# Patient Record
Sex: FEMALE | Race: WHITE | Employment: FULL TIME | URBAN - METROPOLITAN AREA
[De-identification: names, ages, dates, MRNs, and addresses within clinical notes are randomized per-mention and may not be internally consistent; named-entity substitution may affect disease eponyms.]

---

## 2023-10-08 ENCOUNTER — APPOINTMENT (OUTPATIENT)
Dept: GENERAL RADIOLOGY | Age: 28
End: 2023-10-08
Payer: COMMERCIAL

## 2023-10-08 ENCOUNTER — HOSPITAL ENCOUNTER (EMERGENCY)
Age: 28
Discharge: HOME | End: 2023-10-08
Payer: COMMERCIAL

## 2023-10-08 VITALS
HEART RATE: 83 BPM | RESPIRATION RATE: 16 BRPM | TEMPERATURE: 97.3 F | OXYGEN SATURATION: 98 % | SYSTOLIC BLOOD PRESSURE: 113 MMHG | BODY MASS INDEX: 29.82 KG/M2 | DIASTOLIC BLOOD PRESSURE: 78 MMHG | WEIGHT: 190 LBS | HEIGHT: 67 IN
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
## 2023-10-08 DIAGNOSIS — S29.019A THORACIC MYOFASCIAL STRAIN, INITIAL ENCOUNTER: ICD-10-CM

## 2023-10-08 DIAGNOSIS — S16.1XXA CERVICAL STRAIN, ACUTE, INITIAL ENCOUNTER: Primary | ICD-10-CM

## 2023-10-08 PROCEDURE — 72072 X-RAY EXAM THORAC SPINE 3VWS: CPT

## 2023-10-08 PROCEDURE — 99283 EMERGENCY DEPT VISIT LOW MDM: CPT

## 2023-10-08 PROCEDURE — 72050 X-RAY EXAM NECK SPINE 4/5VWS: CPT

## 2023-10-08 ASSESSMENT — PAIN DESCRIPTION - LOCATION: LOCATION: BACK;NECK

## 2023-10-08 ASSESSMENT — LIFESTYLE VARIABLES
HOW MANY STANDARD DRINKS CONTAINING ALCOHOL DO YOU HAVE ON A TYPICAL DAY: PATIENT DOES NOT DRINK
HOW OFTEN DO YOU HAVE A DRINK CONTAINING ALCOHOL: NEVER

## 2023-10-08 ASSESSMENT — PAIN - FUNCTIONAL ASSESSMENT: PAIN_FUNCTIONAL_ASSESSMENT: 0-10

## 2023-10-08 NOTE — ED NOTES
Radiology Procedure Waiver   Name: Blanca Rizzo  : 1995  MRN: [id number]    Date:  10/8/23    Time: 7:36 PM EDT    Benefits of immediately proceeding with Radiology exam(s) without pre-testing outweigh the risks or are not indicated as specified below and therefore the following is/are being waived:    [x] Pregnancy test   [x] Patients LMP on-time and regular.   [] Patient had Tubal Ligation or has other Contraception Device. [] Patient  is Menopausal or Premenarcheal.    [] Patient had Full or Partial Hysterectomy. [] Protocol for Iodine allergy    [] MRI Questionnaire     [] BUN/Creatinine   [] Patient age w/no hx of renal dysfunction. [] Patient on Dialysis. [] Recent Normal Labs.   Electronically signed by Niranjan Fernández PA-C on 10/8/23 at 7:36 PM EDT               Niranjan Fernández PA-C  10/08/23 1936
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
## 2024-10-05 ENCOUNTER — HOSPITAL ENCOUNTER (OUTPATIENT)
Age: 29
Setting detail: OBSERVATION
Discharge: HOME | End: 2024-10-06
Attending: OBSTETRICS & GYNECOLOGY | Admitting: OBSTETRICS & GYNECOLOGY
Payer: OTHER GOVERNMENT

## 2024-10-05 VITALS
DIASTOLIC BLOOD PRESSURE: 71 MMHG | HEART RATE: 98 BPM | WEIGHT: 212 LBS | SYSTOLIC BLOOD PRESSURE: 124 MMHG | TEMPERATURE: 97.9 F | BODY MASS INDEX: 33.27 KG/M2 | RESPIRATION RATE: 18 BRPM | HEIGHT: 67 IN

## 2024-10-05 PROBLEM — Z3A.20 20 WEEKS GESTATION OF PREGNANCY: Status: ACTIVE | Noted: 2024-10-05

## 2024-10-05 LAB
BACTERIA URNS QL MICRO: ABNORMAL
BILIRUB UR QL STRIP: NEGATIVE
CLARITY UR: CLEAR
COLOR UR: YELLOW
EPI CELLS #/AREA URNS HPF: ABNORMAL /HPF
GLUCOSE UR STRIP-MCNC: NEGATIVE MG/DL
HGB UR QL STRIP.AUTO: NEGATIVE
KETONES UR STRIP-MCNC: ABNORMAL MG/DL
LEUKOCYTE ESTERASE UR QL STRIP: NEGATIVE
NITRITE UR QL STRIP: NEGATIVE
PH UR STRIP: 6 [PH] (ref 5–9)
PROT UR STRIP-MCNC: NEGATIVE MG/DL
RBC #/AREA URNS HPF: ABNORMAL /HPF
SP GR UR STRIP: 1.02 (ref 1–1.03)
UROBILINOGEN UR STRIP-ACNC: 0.2 EU/DL (ref 0–1)
WBC #/AREA URNS HPF: ABNORMAL /HPF

## 2024-10-05 PROCEDURE — 87086 URINE CULTURE/COLONY COUNT: CPT

## 2024-10-05 PROCEDURE — 99221 1ST HOSP IP/OBS SF/LOW 40: CPT | Performed by: ADVANCED PRACTICE MIDWIFE

## 2024-10-05 PROCEDURE — G0378 HOSPITAL OBSERVATION PER HR: HCPCS

## 2024-10-05 PROCEDURE — 81001 URINALYSIS AUTO W/SCOPE: CPT

## 2024-10-06 PROCEDURE — G0378 HOSPITAL OBSERVATION PER HR: HCPCS

## 2024-10-06 NOTE — H&P
CHIEF COMPLAINT:  woke up from nap prior to going to work, felt sick lightheaded dizzy and abdominal cramping.  Feels some flutters, no lof or vb    HISTORY OF PRESENT ILLNESS:      The patient is a 29 y.o. female at 20w6d.  OB History          1    Para        Term                AB        Living             SAB        IAB        Ectopic        Molar        Multiple        Live Births                Patient presents with a chief complaint as above and is being admitted for observation    Estimated Due Date: Estimated Date of Delivery: 25    PRENATAL CARE:    Complicated by: low lying placenta, TERE, obesity, hypothyroid on meds.    PAST OB HISTORY  OB History          1    Para        Term                AB        Living             SAB        IAB        Ectopic        Molar        Multiple        Live Births                    Past Medical History:    History reviewed. No pertinent past medical history.  Past Surgical History:    History reviewed. No pertinent surgical history.  Allergies:  Patient has no known allergies.  Social History:    Social History     Socioeconomic History    Marital status: Single     Spouse name: Not on file    Number of children: Not on file    Years of education: Not on file    Highest education level: Not on file   Occupational History    Not on file   Tobacco Use    Smoking status: Never    Smokeless tobacco: Never   Vaping Use    Vaping status: Never Used   Substance and Sexual Activity    Alcohol use: Not Currently    Drug use: Not Currently    Sexual activity: Not on file   Other Topics Concern    Not on file   Social History Narrative    Not on file     Social Determinants of Health     Financial Resource Strain: Not on file   Food Insecurity: Not on file   Transportation Needs: Not on file   Physical Activity: Not on file   Stress: Not on file   Social Connections: Not on file   Intimate Partner Violence: Not on file   Housing Stability: Not

## 2024-10-06 NOTE — PROGRESS NOTES
Patient given verbal and written discharge instructions with standard labor precautions instructed to keep follow up appointment with physician. Patient verbalizes understanding states no questions or concerns. Patient ambulatory off unit with family member.

## 2024-10-06 NOTE — PROGRESS NOTES
at 20w6d presents to antepartum unit with complaints of lightheadedness, feeling shaky, has some occasional cramping starting 45 minutes ago  Denies VB, lof, ha, or blurred vision  States +FM  VSS  FHT 145bpm via doppler  Dixie [place] applied

## 2024-10-06 NOTE — PROGRESS NOTES
Dr Horowitz called and updated on urinalysis results and that patient is not talia. Orders for patient to be discharged home.

## 2024-10-07 LAB
MICROORGANISM SPEC CULT: ABNORMAL
SERVICE CMNT-IMP: ABNORMAL
SPECIMEN DESCRIPTION: ABNORMAL

## 2025-01-23 PROBLEM — L29.9 PRURITUS OF PREGNANCY IN THIRD TRIMESTER: Status: ACTIVE | Noted: 2025-01-23

## 2025-01-23 PROBLEM — O46.8X2 SUBCHORIONIC HEMATOMA IN SECOND TRIMESTER: Status: ACTIVE | Noted: 2025-01-23

## 2025-01-23 PROBLEM — O41.8X20 SUBCHORIONIC HEMATOMA IN SECOND TRIMESTER: Status: ACTIVE | Noted: 2025-01-23

## 2025-01-23 PROBLEM — E55.9 VITAMIN D INSUFFICIENCY: Status: ACTIVE | Noted: 2025-01-23

## 2025-01-23 PROBLEM — O99.213 OBESITY AFFECTING PREGNANCY IN THIRD TRIMESTER: Status: ACTIVE | Noted: 2025-01-23

## 2025-01-23 PROBLEM — O99.713 PRURITUS OF PREGNANCY IN THIRD TRIMESTER: Status: ACTIVE | Noted: 2025-01-23

## 2025-01-23 PROBLEM — E03.9 HYPOTHYROIDISM DURING PREGNANCY IN THIRD TRIMESTER: Status: ACTIVE | Noted: 2025-01-23

## 2025-01-23 PROBLEM — Z34.03 PRIMIGRAVIDA IN THIRD TRIMESTER: Status: ACTIVE | Noted: 2025-01-23

## 2025-01-23 PROBLEM — O99.283 HYPOTHYROIDISM DURING PREGNANCY IN THIRD TRIMESTER: Status: ACTIVE | Noted: 2025-01-23

## 2025-01-23 PROBLEM — Z34.90 FUNDAL HEIGHT HIGH FOR DATES: Status: ACTIVE | Noted: 2025-01-23
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
## 2025-02-03 PROBLEM — Z3A.38 38 WEEKS GESTATION OF PREGNANCY: Status: ACTIVE | Noted: 2024-10-05

## 2025-02-13 ENCOUNTER — HOSPITAL ENCOUNTER (OUTPATIENT)
Age: 30
Discharge: HOME | End: 2025-02-13
Attending: OBSTETRICS & GYNECOLOGY | Admitting: OBSTETRICS & GYNECOLOGY
Payer: OTHER GOVERNMENT

## 2025-02-13 VITALS
HEART RATE: 121 BPM | RESPIRATION RATE: 18 BRPM | DIASTOLIC BLOOD PRESSURE: 81 MMHG | TEMPERATURE: 98.1 F | SYSTOLIC BLOOD PRESSURE: 127 MMHG

## 2025-02-13 PROBLEM — Z3A.39 39 WEEKS GESTATION OF PREGNANCY: Status: ACTIVE | Noted: 2025-02-13

## 2025-02-13 LAB
AMNISURE, POC: NEGATIVE
Lab: NORMAL
NEGATIVE QC PASS/FAIL: NORMAL
POSITIVE QC PASS/FAIL: NORMAL

## 2025-02-13 PROCEDURE — 84112 EVAL AMNIOTIC FLUID PROTEIN: CPT

## 2025-02-13 PROCEDURE — 99211 OFF/OP EST MAY X REQ PHY/QHP: CPT

## 2025-02-13 NOTE — DISCHARGE INSTRUCTIONS
Home Undelivered Discharge Instructions    After Discharge Orders:    Future Appointments   Date Time Provider Department Center   2/16/2025  9:00 PM [facility] L&D GEN RM 01 [facility] OP LD [facility] HOD               Diet:  normal diet as tolerated    Rest: normal activity as tolerated    Other instructions: Do kick counts once a day on your baby. Choose the time of day your baby is most active. Get in a comfortable lying or sitting position and time how long it takes to feel 10 kicks, twists, turns, swishes, or rolls. Call your physician or midwife if there have not been 10 kicks in 2 hours    Call physician or midwife, return to Labor and Delivery, call 911, or go to the nearest Emergency Room if: increased leakage or fluid, contractions more than  6 per  1 hour, decreased fetal movement, persistent low back pain or cramping, bleeding from vaginal area, difficulty urinating, pain with urination, difficulty breathing, new calf pain, persistent headache, or vision change

## 2025-02-13 NOTE — H&P
Department of Obstetrics and Gynecology  Attending Obstetrics History and Physical      HISTORY OF PRESENT ILLNESS:      The patient is a 29 y.o.  1 parity 0 at 39 weeks 4 days.  Complaining of lof times one today. None since. No regular ctx's.no pih symptoms. For induction 25    Estimated Due Date:  25  Contractions:  no  nfm  Blleeding:  No    PRENATAL CARE:    Complications: No      Active Problems:    * No active hospital problems. *  Resolved Problems:    * No resolved hospital problems. *        PAST OB HISTORY    OB History    Para Term  AB Living   1             SAB IAB Ectopic Molar Multiple Live Births                    # Outcome Date GA Lbr Garo/2nd Weight Sex Type Anes PTL Lv   1 Current                    Pre-eclampsia:  No      :  No      D & C:  No      Cerclage:  No      LEEP:  No      Myomectomy:  No       Labor: No    Past Medical History:    History reviewed. No pertinent past medical history.     Past Surgical History:    History reviewed. No pertinent surgical history.     Past Family History:  History reviewed. No pertinent family history.    ROS:  Const: No fever, chills, night sweats, no recent unexplained weight gain/loss  HEENT: No blurred vision, double vision; no ear problems; no sore throat, congestion; no running nose.  Resp: No cough, no sputum, no pleuritic chest pain, no sob  Cardio: No chest pain, no exertional dyspnea, no PND, no orthopnea, no palpitation, no leg swelling.   GI: No dysphagia, no reflux; no abdominal pain, no n/v; no c/d. No hematochezia    : No dysuria, no frequency, hesitancy; no hematuria  MSK: no joint pain, no myalgia, no change in ROM  Neuro: no focal weakness in extremities, no slurred speech, no double vision, no numbness or tingling in extremities  Endo: no heat/cold intolerance, no polyphagia, polydipsia or polyuria  Hem: no increased bleeding, no bruising, no lymphadenopathy  Skin: no skin changes  Psych:

## 2025-02-13 NOTE — PROGRESS NOTES
Patient presents to L&D with complaints of leaking of fluid for 2 days. Denies VB. Reports occasional contractions. +FM   EFM applied

## 2025-02-16 ENCOUNTER — HOSPITAL ENCOUNTER (INPATIENT)
Age: 30
LOS: 4 days | Discharge: HOME | End: 2025-02-20
Attending: OBSTETRICS & GYNECOLOGY | Admitting: OBSTETRICS & GYNECOLOGY
Payer: OTHER GOVERNMENT

## 2025-02-16 ENCOUNTER — APPOINTMENT (OUTPATIENT)
Dept: LABOR AND DELIVERY | Age: 30
End: 2025-02-16
Payer: OTHER GOVERNMENT

## 2025-02-16 PROBLEM — O42.90 PROLONGED RUPTURE OF MEMBRANES: Status: ACTIVE | Noted: 2025-02-16

## 2025-02-16 PROBLEM — Z3A.38 38 WEEKS GESTATION OF PREGNANCY: Status: RESOLVED | Noted: 2024-10-05 | Resolved: 2025-02-16

## 2025-02-16 PROBLEM — Z3A.39 39 WEEKS GESTATION OF PREGNANCY: Status: RESOLVED | Noted: 2025-02-13 | Resolved: 2025-02-16

## 2025-02-16 PROBLEM — Z3A.40 40 WEEKS GESTATION OF PREGNANCY: Status: ACTIVE | Noted: 2025-02-16

## 2025-02-16 LAB
ABO + RH BLD: NORMAL
AMNISURE, POC: POSITIVE
AMPHET UR QL SCN: NEGATIVE
ARM BAND NUMBER: NORMAL
BARBITURATES UR QL SCN: NEGATIVE
BENZODIAZ UR QL: NEGATIVE
BLOOD BANK SAMPLE EXPIRATION: NORMAL
BLOOD GROUP ANTIBODIES SERPL: NEGATIVE
BUPRENORPHINE UR QL: NEGATIVE
CANNABINOIDS UR QL SCN: NEGATIVE
COCAINE UR QL SCN: NEGATIVE
ERYTHROCYTE [DISTWIDTH] IN BLOOD BY AUTOMATED COUNT: 13 % (ref 11.5–15)
FENTANYL UR QL: NEGATIVE
HCT VFR BLD AUTO: 36 % (ref 34–48)
HGB BLD-MCNC: 11.8 G/DL (ref 11.5–15.5)
Lab: NORMAL
MCH RBC QN AUTO: 29.4 PG (ref 26–35)
MCHC RBC AUTO-ENTMCNC: 32.8 G/DL (ref 32–34.5)
MCV RBC AUTO: 89.8 FL (ref 80–99.9)
METHADONE UR QL: NEGATIVE
NEGATIVE QC PASS/FAIL: NORMAL
OPIATES UR QL SCN: NEGATIVE
OXYCODONE UR QL SCN: NEGATIVE
PCP UR QL SCN: NEGATIVE
PLATELET # BLD AUTO: 233 K/UL (ref 130–450)
PMV BLD AUTO: 11 FL (ref 7–12)
POSITIVE QC PASS/FAIL: NORMAL
RBC # BLD AUTO: 4.01 M/UL (ref 3.5–5.5)
TEST INFORMATION: NORMAL
WBC OTHER # BLD: 10.3 K/UL (ref 4.5–11.5)

## 2025-02-16 PROCEDURE — 80307 DRUG TEST PRSMV CHEM ANLYZR: CPT

## 2025-02-16 PROCEDURE — 1220000001 HC SEMI PRIVATE L&D R&B

## 2025-02-16 PROCEDURE — 84112 EVAL AMNIOTIC FLUID PROTEIN: CPT

## 2025-02-16 PROCEDURE — 86900 BLOOD TYPING SEROLOGIC ABO: CPT

## 2025-02-16 PROCEDURE — 86850 RBC ANTIBODY SCREEN: CPT

## 2025-02-16 PROCEDURE — 86901 BLOOD TYPING SEROLOGIC RH(D): CPT

## 2025-02-16 PROCEDURE — NBSRV NON-BILLABLE SERVICE: Performed by: MIDWIFE

## 2025-02-16 PROCEDURE — 6370000000 HC RX 637 (ALT 250 FOR IP): Performed by: OBSTETRICS & GYNECOLOGY

## 2025-02-16 PROCEDURE — 85027 COMPLETE CBC AUTOMATED: CPT

## 2025-02-16 RX ORDER — TERBUTALINE SULFATE 1 MG/ML
0.25 INJECTION SUBCUTANEOUS
Status: ACTIVE | OUTPATIENT
Start: 2025-02-16 | End: 2025-02-17

## 2025-02-16 RX ORDER — METHYLERGONOVINE MALEATE 0.2 MG/ML
200 INJECTION INTRAVENOUS PRN
Status: DISCONTINUED | OUTPATIENT
Start: 2025-02-16 | End: 2025-02-19

## 2025-02-16 RX ORDER — SODIUM CHLORIDE, SODIUM LACTATE, POTASSIUM CHLORIDE, CALCIUM CHLORIDE 600; 310; 30; 20 MG/100ML; MG/100ML; MG/100ML; MG/100ML
INJECTION, SOLUTION INTRAVENOUS CONTINUOUS
Status: DISCONTINUED | OUTPATIENT
Start: 2025-02-16 | End: 2025-02-19

## 2025-02-16 RX ORDER — BUTORPHANOL TARTRATE 2 MG/ML
2 INJECTION, SOLUTION INTRAMUSCULAR; INTRAVENOUS
Status: DISCONTINUED | OUTPATIENT
Start: 2025-02-16 | End: 2025-02-19

## 2025-02-16 RX ORDER — MISOPROSTOL 200 UG/1
400 TABLET ORAL PRN
Status: DISCONTINUED | OUTPATIENT
Start: 2025-02-16 | End: 2025-02-19

## 2025-02-16 RX ORDER — SODIUM CHLORIDE 0.9 % (FLUSH) 0.9 %
5-40 SYRINGE (ML) INJECTION PRN
Status: DISCONTINUED | OUTPATIENT
Start: 2025-02-16 | End: 2025-02-19

## 2025-02-16 RX ORDER — CARBOPROST TROMETHAMINE 250 UG/ML
250 INJECTION, SOLUTION INTRAMUSCULAR PRN
Status: DISCONTINUED | OUTPATIENT
Start: 2025-02-16 | End: 2025-02-19

## 2025-02-16 RX ORDER — SODIUM CHLORIDE, SODIUM LACTATE, POTASSIUM CHLORIDE, AND CALCIUM CHLORIDE .6; .31; .03; .02 G/100ML; G/100ML; G/100ML; G/100ML
500 INJECTION, SOLUTION INTRAVENOUS PRN
Status: DISCONTINUED | OUTPATIENT
Start: 2025-02-16 | End: 2025-02-19

## 2025-02-16 RX ORDER — TRANEXAMIC ACID 10 MG/ML
1000 INJECTION, SOLUTION INTRAVENOUS
Status: ACTIVE | OUTPATIENT
Start: 2025-02-16 | End: 2025-02-17

## 2025-02-16 RX ORDER — SODIUM CHLORIDE 9 MG/ML
25 INJECTION, SOLUTION INTRAVENOUS PRN
Status: DISCONTINUED | OUTPATIENT
Start: 2025-02-16 | End: 2025-02-19

## 2025-02-16 RX ORDER — DOCUSATE SODIUM 100 MG/1
100 CAPSULE, LIQUID FILLED ORAL 2 TIMES DAILY
Status: DISCONTINUED | OUTPATIENT
Start: 2025-02-16 | End: 2025-02-19

## 2025-02-16 RX ORDER — SODIUM CHLORIDE 0.9 % (FLUSH) 0.9 %
5-40 SYRINGE (ML) INJECTION EVERY 12 HOURS SCHEDULED
Status: DISCONTINUED | OUTPATIENT
Start: 2025-02-16 | End: 2025-02-19

## 2025-02-16 RX ORDER — ONDANSETRON 4 MG/1
4 TABLET, ORALLY DISINTEGRATING ORAL EVERY 6 HOURS PRN
Status: DISCONTINUED | OUTPATIENT
Start: 2025-02-16 | End: 2025-02-19

## 2025-02-16 RX ORDER — ONDANSETRON 2 MG/ML
4 INJECTION INTRAMUSCULAR; INTRAVENOUS EVERY 6 HOURS PRN
Status: DISCONTINUED | OUTPATIENT
Start: 2025-02-16 | End: 2025-02-19

## 2025-02-16 RX ADMIN — Medication 25 MCG: at 22:00

## 2025-02-17 ENCOUNTER — ANESTHESIA EVENT (OUTPATIENT)
Dept: LABOR AND DELIVERY | Age: 30
End: 2025-02-17
Payer: OTHER GOVERNMENT

## 2025-02-17 ENCOUNTER — ANESTHESIA (OUTPATIENT)
Dept: LABOR AND DELIVERY | Age: 30
End: 2025-02-17
Payer: OTHER GOVERNMENT

## 2025-02-17 PROCEDURE — 2500000003 HC RX 250 WO HCPCS

## 2025-02-17 PROCEDURE — 3700000025 EPIDURAL BLOCK: Performed by: ANESTHESIOLOGY

## 2025-02-17 PROCEDURE — 6360000002 HC RX W HCPCS

## 2025-02-17 PROCEDURE — 1220000001 HC SEMI PRIVATE L&D R&B

## 2025-02-17 PROCEDURE — 6360000002 HC RX W HCPCS: Performed by: OBSTETRICS & GYNECOLOGY

## 2025-02-17 PROCEDURE — 59051 FETAL MONITOR/INTERPRET ONLY: CPT | Performed by: MIDWIFE

## 2025-02-17 PROCEDURE — 6370000000 HC RX 637 (ALT 250 FOR IP): Performed by: OBSTETRICS & GYNECOLOGY

## 2025-02-17 RX ORDER — ONDANSETRON 2 MG/ML
4 INJECTION INTRAMUSCULAR; INTRAVENOUS EVERY 6 HOURS PRN
Status: DISCONTINUED | OUTPATIENT
Start: 2025-02-17 | End: 2025-02-19

## 2025-02-17 RX ORDER — ACETAMINOPHEN 650 MG
TABLET, EXTENDED RELEASE ORAL
Status: DISPENSED
Start: 2025-02-17 | End: 2025-02-18

## 2025-02-17 RX ORDER — LIDOCAINE HYDROCHLORIDE 10 MG/ML
INJECTION, SOLUTION INFILTRATION; PERINEURAL
Status: DISPENSED
Start: 2025-02-17 | End: 2025-02-18

## 2025-02-17 RX ORDER — BUPIVACAINE HYDROCHLORIDE 2.5 MG/ML
INJECTION, SOLUTION EPIDURAL; INFILTRATION; INTRACAUDAL
Status: DISCONTINUED | OUTPATIENT
Start: 2025-02-17 | End: 2025-02-18 | Stop reason: SDUPTHER

## 2025-02-17 RX ORDER — EPHEDRINE SULFATE/0.9% NACL/PF 25 MG/5 ML
5 SYRINGE (ML) INTRAVENOUS ONCE
Status: COMPLETED | OUTPATIENT
Start: 2025-02-17 | End: 2025-02-17

## 2025-02-17 RX ORDER — NALOXONE HYDROCHLORIDE 0.4 MG/ML
INJECTION, SOLUTION INTRAMUSCULAR; INTRAVENOUS; SUBCUTANEOUS PRN
Status: DISCONTINUED | OUTPATIENT
Start: 2025-02-17 | End: 2025-02-19

## 2025-02-17 RX ORDER — EPHEDRINE SULFATE/0.9% NACL/PF 25 MG/5 ML
SYRINGE (ML) INTRAVENOUS
Status: COMPLETED
Start: 2025-02-17 | End: 2025-02-17

## 2025-02-17 RX ADMIN — Medication 1 MILLI-UNITS/MIN: at 15:26

## 2025-02-17 RX ADMIN — ONDANSETRON 4 MG: 2 INJECTION, SOLUTION INTRAMUSCULAR; INTRAVENOUS at 10:56

## 2025-02-17 RX ADMIN — BUTORPHANOL TARTRATE 2 MG: 2 INJECTION, SOLUTION INTRAMUSCULAR; INTRAVENOUS at 07:04

## 2025-02-17 RX ADMIN — BUPIVACAINE HYDROCHLORIDE 10 ML: 2.5 INJECTION, SOLUTION EPIDURAL; INFILTRATION; INTRACAUDAL; PERINEURAL at 22:03

## 2025-02-17 RX ADMIN — Medication 50 MCG: at 11:13

## 2025-02-17 RX ADMIN — Medication 10 ML: at 10:38

## 2025-02-17 RX ADMIN — BUTORPHANOL TARTRATE 2 MG: 2 INJECTION, SOLUTION INTRAMUSCULAR; INTRAVENOUS at 23:53

## 2025-02-17 RX ADMIN — Medication 15 ML/HR: at 18:41

## 2025-02-17 RX ADMIN — Medication 50 MCG: at 07:00

## 2025-02-17 RX ADMIN — Medication 5 MG: at 18:50

## 2025-02-17 RX ADMIN — Medication 10 ML: at 18:39

## 2025-02-17 RX ADMIN — Medication 25 MCG: at 02:00

## 2025-02-17 ASSESSMENT — PAIN DESCRIPTION - DESCRIPTORS: DESCRIPTORS: DISCOMFORT;PRESSURE

## 2025-02-17 ASSESSMENT — PAIN SCALES - GENERAL
PAINLEVEL_OUTOF10: 10
PAINLEVEL_OUTOF10: 7

## 2025-02-17 ASSESSMENT — PAIN DESCRIPTION - LOCATION
LOCATION: PELVIS;BACK
LOCATION: ABDOMEN

## 2025-02-17 ASSESSMENT — PAIN DESCRIPTION - ORIENTATION: ORIENTATION: LOWER

## 2025-02-17 NOTE — PROGRESS NOTES
Assumed care of patient. Patient resting comfortably in bed. Denies any needs at this time. Call bell within reach.

## 2025-02-17 NOTE — ANESTHESIA PRE PROCEDURE
Department of Anesthesiology  Preprocedure Note       Name:  Ge Camarillo   Age:  29 y.o.  :  1995                                          MRN:  [id number]         Date:  2025      Surgeon: * No surgeons listed *    Procedure: * No procedures listed *    Medications prior to admission:   Prior to Admission medications    Medication Sig Start Date End Date Taking? Authorizing Provider   vitamin D 50 MCG (2000) CAPS capsule Take 1 capsule by mouth daily   Yes ProviderTono MD   levothyroxine (SYNTHROID) 50 MCG tablet Take 1 tablet by mouth daily 24  Yes Janette Altamirano APRN - CNP   PREN-FE-METH-FA-OMEG W/O A PO Take by mouth   Yes Tono Fuentes MD   ursodiol (ACTIGALL) 300 MG capsule Take 1 capsule by mouth 2 times daily  Patient not taking: Reported on 24   Janette Altamirano APRN - CNP       Current medications:    Current Facility-Administered Medications   Medication Dose Route Frequency Provider Last Rate Last Admin    miSOPROStol (CYTOTEC) pre-split tablet TABS 50 mcg  50 mcg Oral Q4H Edson Babcock MD   50 mcg at 25 0700    naloxone 0.4 mg in 10 mL sodium chloride syringe   IntraVENous PRN Deandre Mcdonald APRN - CRNA        ondansetron (ZOFRAN) injection 4 mg  4 mg IntraVENous Q6H PRN Deandre Mcdonald APRN - CRNA        fentaNYL 1.85mcg/ml and Bupivicaine 0.1% in 0.9% NS 135ml infusion (OB) epidural  15 mL/hr Epidural Continuous Deandre Mcdonald APRN - CRNA        lactated ringers infusion   IntraVENous Continuous Edson Babcock MD        lactated ringers bolus 500 mL  500 mL IntraVENous PRN Edson Babcock MD        Or    lactated ringers bolus 500 mL  500 mL IntraVENous PRN Edson Babcock MD        sodium chloride flush 0.9 % injection 5-40 mL  5-40 mL IntraVENous 2 times per day Edson Babcock MD        sodium chloride flush 0.9 % injection 5-40 mL  5-40 mL IntraVENous PRN Edson Babcock MD        0.9 % sodium chloride

## 2025-02-17 NOTE — PROGRESS NOTES
Called Dr Babcock for orders for scheduled induction. SVE closed/ 80/ -1. Pt has been leaking fluid for 5 days and come in 3 days ago with a negative amnisure. Today, her amnisure is positive. Orders for vaginal cytotec 25mcg verified with provider. Orders for Stadol Q3 and an epidural when needed.

## 2025-02-17 NOTE — H&P
Department of Obstetrics and Gynecology  Labor and Delivery  History & Physical    Patient:  Ge Camarillo     Admit Date:  2025  9:03 PM  Medical Record Number:  [id number]    CHIEF COMPLAINT:  IOL  PROBLEM LIST:     Patient Active Problem List   Diagnosis    Primigravida in third trimester    Fundal height high for dates: 38cm at 35w: EFW 6#2 (69.7%; GHADA 21.99)    Hypothyroidism dx during pregnancy in third trimester - levothyroxine 50mcg daily    Subchorionic hematoma in second trimester    Pruritus of pregnancy in third trimester at 28w, generalized - IHC workup negative - symptoms improved    Vitamin D insufficiency    BMI 31.0-31.9,adult (31.16 pregravid BMI)    Obesity affecting pregnancy in third trimester    Fall at home    40 weeks gestation of pregnancy    Prolonged rupture of membranes           HISTORY OF PRESENT ILLNESS:    The patient is a 29 y.o.  female  at 40w0d. Patient presents  for scheduled IOL. Pt states has had watery discharge since 25.  Came in on 25 for evaluation of watery discharge, amnisure was negative and she was discharged to home. She states she continued to leak fluid and was not sure if she should come back because she was told it was negative and to go home.  Amnisure positive today. + FM No VB.  No ctx/cramping.      ESTIMATED DUE DATE: Estimated Date of Delivery: 25    PRENATAL CARE:  Complicated by: Hypothyroidism, subchorionic hematoma second trimester, pruritus of pregnancy, Vitamin D deficiency, Obesity, Possible SROM for 5 days   GBS: negative    Past OB History  OB History          1    Para        Term                AB        Living             SAB        IAB        Ectopic        Molar        Multiple        Live Births                    Past Medical History:    History reviewed. No pertinent past medical history.    Past Surgical History:    History reviewed. No pertinent surgical history.    Allergies:  Patient has no known

## 2025-02-17 NOTE — ANESTHESIA PROCEDURE NOTES
Epidural Block    Patient location during procedure: OB  Start time: 2/17/2025 6:37 PM  Reason for block: labor epidural  Staffing  Performed: resident/CRNA   Resident/CRNA: Deandre Mcdonald APRN - CRNA  Other anesthesia staff: James Novoa RN  Performed by: Deandre Mcdonald APRN - CRNA  Authorized by: James Travis MD    Epidural  Patient position: sitting  Prep: ChloraPrep  Patient monitoring: continuous pulse ox and frequent blood pressure checks  Approach: midline  Location: L3-4  Injection technique: YAW air  Provider prep: mask and sterile gloves  Needle  Needle type: Tuohy   Needle gauge: 18 G  Needle length: 3.5 in  Needle insertion depth: 8 cm  Catheter type: end hole  Catheter size: 19 G (20g)  Catheter at skin depth: 15 cm  Test dose: negativeCatheter Secured: tegaderm and tape  Assessment  Sensory level: T4  Hemodynamics: stable  Attempts: 1  Outcomes: uncomplicated  Preanesthetic Checklist  Completed: patient identified, IV checked, site marked, risks and benefits discussed, surgical/procedural consents, equipment checked, pre-op evaluation, timeout performed, anesthesia consent given, oxygen available, monitors applied/VS acknowledged, fire risk safety assessment completed and verbalized and blood product R/B/A discussed and consented

## 2025-02-17 NOTE — PROGRESS NOTES
Pt is  a  40w0d presenting for scheduled induction. Pt denies VB. +FM. Amnisure performed due to leaking of fluid over the past couple days. Amnisure was positive. Pt denies any complications with this pregnancy. Pt placed on EFM. Call light within reach.

## 2025-02-17 NOTE — PROGRESS NOTES
Dr. Babcock updated patient received an epidural. SVE 1/80/-1. Orders for another dose of 50mcg oral cytotec.

## 2025-02-17 NOTE — ANESTHESIA PROCEDURE NOTES
Epidural Block    Patient location during procedure: OB  Start time: 2/17/2025 10:31 AM  Reason for block: labor epidural  Staffing  Performed: resident/CRNA and other anesthesia staff   Resident/CRNA: Deandre Mcdonald APRN - CRNA  Other anesthesia staff: Vasu Long RN  Performed by: Deandre Mcdonald APRN - CRNA  Authorized by: Deandre Mcdonald APRN - CRNA    Epidural  Patient position: sitting  Prep: ChloraPrep  Patient monitoring: continuous pulse ox and frequent blood pressure checks  Approach: midline  Location: L3-4  Injection technique: YAW air  Provider prep: mask and sterile gloves  Needle  Needle type: Tuohy   Needle gauge: 18 G  Needle length: 3.5 in  Needle insertion depth: 7.5 cm  Catheter type: end hole  Catheter size: 19 G (20g)  Catheter at skin depth: 14 cm  Test dose: negativeCatheter Secured: tegaderm and tape  Assessment  Hemodynamics: stable  Attempts: 1  Outcomes: uncomplicated  Preanesthetic Checklist  Completed: patient identified, IV checked, site marked, risks and benefits discussed, surgical/procedural consents, equipment checked, pre-op evaluation, timeout performed, anesthesia consent given, oxygen available, monitors applied/VS acknowledged, fire risk safety assessment completed and verbalized and blood product R/B/A discussed and consented

## 2025-02-18 PROCEDURE — 7200000001 HC VAGINAL DELIVERY

## 2025-02-18 PROCEDURE — 1220000000 HC SEMI PRIVATE OB R&B

## 2025-02-18 PROCEDURE — 3E033VJ INTRODUCTION OF OTHER HORMONE INTO PERIPHERAL VEIN, PERCUTANEOUS APPROACH: ICD-10-PCS | Performed by: OBSTETRICS & GYNECOLOGY

## 2025-02-18 PROCEDURE — 0KQM0ZZ REPAIR PERINEUM MUSCLE, OPEN APPROACH: ICD-10-PCS | Performed by: OBSTETRICS & GYNECOLOGY

## 2025-02-18 PROCEDURE — 6360000002 HC RX W HCPCS

## 2025-02-18 PROCEDURE — 3E0P7VZ INTRODUCTION OF HORMONE INTO FEMALE REPRODUCTIVE, VIA NATURAL OR ARTIFICIAL OPENING: ICD-10-PCS | Performed by: OBSTETRICS & GYNECOLOGY

## 2025-02-18 PROCEDURE — 6360000002 HC RX W HCPCS: Performed by: OBSTETRICS & GYNECOLOGY

## 2025-02-18 PROCEDURE — 6370000000 HC RX 637 (ALT 250 FOR IP): Performed by: OBSTETRICS & GYNECOLOGY

## 2025-02-18 RX ORDER — FENTANYL CITRATE 50 UG/ML
INJECTION, SOLUTION INTRAMUSCULAR; INTRAVENOUS
Status: COMPLETED
Start: 2025-02-18 | End: 2025-02-18

## 2025-02-18 RX ORDER — DOCUSATE SODIUM 100 MG/1
100 CAPSULE, LIQUID FILLED ORAL 2 TIMES DAILY
Status: DISCONTINUED | OUTPATIENT
Start: 2025-02-18 | End: 2025-02-20 | Stop reason: HOSPADM

## 2025-02-18 RX ORDER — FENTANYL CITRATE 50 UG/ML
INJECTION, SOLUTION INTRAMUSCULAR; INTRAVENOUS
Status: DISCONTINUED | OUTPATIENT
Start: 2025-02-18 | End: 2025-02-18 | Stop reason: SDUPTHER

## 2025-02-18 RX ORDER — ONDANSETRON 4 MG/1
4 TABLET, ORALLY DISINTEGRATING ORAL EVERY 6 HOURS PRN
Status: DISCONTINUED | OUTPATIENT
Start: 2025-02-18 | End: 2025-02-20 | Stop reason: HOSPADM

## 2025-02-18 RX ORDER — LEVOTHYROXINE SODIUM 50 UG/1
50 TABLET ORAL
Status: DISCONTINUED | OUTPATIENT
Start: 2025-02-19 | End: 2025-02-20 | Stop reason: HOSPADM

## 2025-02-18 RX ORDER — PRENATAL WITH FERROUS FUM AND FOLIC ACID 3080; 920; 120; 400; 22; 1.84; 3; 20; 10; 1; 12; 200; 27; 25; 2 [IU]/1; [IU]/1; MG/1; [IU]/1; MG/1; MG/1; MG/1; MG/1; MG/1; MG/1; UG/1; MG/1; MG/1; MG/1; MG/1
1 TABLET ORAL
Status: DISCONTINUED | OUTPATIENT
Start: 2025-02-19 | End: 2025-02-20 | Stop reason: HOSPADM

## 2025-02-18 RX ORDER — DIPHENHYDRAMINE HYDROCHLORIDE 50 MG/ML
INJECTION INTRAMUSCULAR; INTRAVENOUS
Status: DISCONTINUED | OUTPATIENT
Start: 2025-02-18 | End: 2025-02-18 | Stop reason: SDUPTHER

## 2025-02-18 RX ORDER — MODIFIED LANOLIN
OINTMENT (GRAM) TOPICAL PRN
Status: DISCONTINUED | OUTPATIENT
Start: 2025-02-18 | End: 2025-02-20 | Stop reason: HOSPADM

## 2025-02-18 RX ORDER — ONDANSETRON 2 MG/ML
4 INJECTION INTRAMUSCULAR; INTRAVENOUS EVERY 6 HOURS PRN
Status: DISCONTINUED | OUTPATIENT
Start: 2025-02-18 | End: 2025-02-20 | Stop reason: HOSPADM

## 2025-02-18 RX ORDER — IBUPROFEN 600 MG/1
600 TABLET, FILM COATED ORAL EVERY 6 HOURS PRN
Status: DISCONTINUED | OUTPATIENT
Start: 2025-02-18 | End: 2025-02-20 | Stop reason: HOSPADM

## 2025-02-18 RX ORDER — DIPHENHYDRAMINE HYDROCHLORIDE 50 MG/ML
INJECTION INTRAMUSCULAR; INTRAVENOUS
Status: COMPLETED
Start: 2025-02-18 | End: 2025-02-18

## 2025-02-18 RX ORDER — ACETAMINOPHEN 500 MG
1000 TABLET ORAL EVERY 6 HOURS PRN
Status: DISCONTINUED | OUTPATIENT
Start: 2025-02-18 | End: 2025-02-20 | Stop reason: HOSPADM

## 2025-02-18 RX ADMIN — DIPHENHYDRAMINE HYDROCHLORIDE 25 MG: 50 INJECTION, SOLUTION INTRAMUSCULAR; INTRAVENOUS at 00:26

## 2025-02-18 RX ADMIN — IBUPROFEN 600 MG: 600 TABLET, FILM COATED ORAL at 16:26

## 2025-02-18 RX ADMIN — ACETAMINOPHEN 1000 MG: 500 TABLET ORAL at 14:43

## 2025-02-18 RX ADMIN — Medication: at 12:30

## 2025-02-18 RX ADMIN — IBUPROFEN 600 MG: 600 TABLET, FILM COATED ORAL at 22:36

## 2025-02-18 RX ADMIN — Medication 1 MILLI-UNITS/MIN: at 02:13

## 2025-02-18 RX ADMIN — FENTANYL CITRATE 25 MCG: 50 INJECTION, SOLUTION INTRAMUSCULAR; INTRAVENOUS at 00:23

## 2025-02-18 RX ADMIN — DOCUSATE SODIUM 100 MG: 100 CAPSULE, LIQUID FILLED ORAL at 21:11

## 2025-02-18 RX ADMIN — DIPHENHYDRAMINE HYDROCHLORIDE 25 MG: 50 INJECTION, SOLUTION INTRAMUSCULAR; INTRAVENOUS at 00:15

## 2025-02-18 ASSESSMENT — PAIN SCALES - GENERAL
PAINLEVEL_OUTOF10: 5
PAINLEVEL_OUTOF10: 6
PAINLEVEL_OUTOF10: 7

## 2025-02-18 ASSESSMENT — PAIN DESCRIPTION - LOCATION
LOCATION: ABDOMEN;PERINEUM
LOCATION: ABDOMEN
LOCATION: ABDOMEN

## 2025-02-18 ASSESSMENT — PAIN DESCRIPTION - ORIENTATION
ORIENTATION: LOWER

## 2025-02-18 ASSESSMENT — PAIN DESCRIPTION - DESCRIPTORS
DESCRIPTORS: CRAMPING;DISCOMFORT
DESCRIPTORS: ACHING;DISCOMFORT;CRAMPING
DESCRIPTORS: ACHING;CRAMPING;DISCOMFORT;SORE

## 2025-02-18 NOTE — PROGRESS NOTES
Pt ambulated to restroom with standby assist . Pt able to void at this time. Bleeding small, no clots noted. Yael care completed with minimal assist. Pt c/o slight lightheadedness with ambulation. Pt returned to bed and says resolved after laying down. Pt ordered lunch at this time and states she hasn't eaten in 2 days prior to labor which is probably why she felt dizzy.

## 2025-02-18 NOTE — PROGRESS NOTES
Dr. Babcock at nurses station. Updated patient sat for a new epidural. Patients blood pressure dropped after epidural and baby had a prolonged decel. No new orders at this time.

## 2025-02-18 NOTE — PROGRESS NOTES
Dr. Babcock at nurses station. New order to give Stadol 2mg once with epidural and get patient a CSE and to turn off pitocin while waiting for pain relief

## 2025-02-18 NOTE — ANESTHESIA PROCEDURE NOTES
CSE Block    Patient location during procedure: OB  Start time: 2/18/2025 12:23 AM  Reason for block: labor epidural  Staffing  Resident/CRNA: Deandre Mcdonald APRN - CRNA  Performed by: Deandre Mcdonald APRN - CRNA  Authorized by: James Travis MD    CSE  Patient position: sitting  Prep: ChloraPrep  Patient monitoring: cardiac monitor, continuous pulse ox and frequent blood pressure checks  Approach: midline  Provider prep: sterile gloves and mask  Spinal Needle  Needle type: Quincke   Needle gauge: 25 G  Needle length: 6 in  Needle insertion depth: 8 cm  Epidural Needle  Injection technique: YAW air  Needle type: Tuohy   Needle gauge: 18 G  Needle length: 3.5 in  Needle insertion depth: 7.5 cm  Location: lumbar (1-5)  Catheter  Catheter type: side hole  Catheter size: 19 G  Catheter at skin depth: 12 cm  Test dose: negative  AssessmentT6 and T4  Hemodynamics: stable  Preanesthetic Checklist  Completed: patient identified, IV checked, site marked, risks and benefits discussed, surgical/procedural consents, equipment checked, pre-op evaluation, timeout performed, anesthesia consent given, oxygen available, monitors applied/VS acknowledged, fire risk safety assessment completed and verbalized and blood product R/B/A discussed and consented

## 2025-02-18 NOTE — PROGRESS NOTES
0452 patient requesting Dr. Babcock at bedside to talk to. Dr. Babcock notified.  0455 Dr. Babcock at bedside. SVE 7/90/+1 tracing reviewed

## 2025-02-18 NOTE — LACTATION NOTE
First time mom. Observed baby during a breastfeed on the left breast in cradle hold.  Mom stated latch was a bit painful so we changed positioning to football and mom's pain went away.  Baby nursed well.  Instructed on normal infant behavior, benefits of colostrum/breast milk for baby and mom,  benefits of skin to skin and components of safe positioning.  Encouraged rooming-in and avoidance of pacifier use until breastfeeding is well established.  Reviewed latch techniques, positioning, signs of effective milk transfer, waking techniques and the importance of frequent feedings- 8-12 times/ 24 hrs to stimulate/maintain milk production. Taught hand expression and encouraged to express drops of colostrum at start of feeding.  Reviewed hunger cues and expected urine/stool output and transition.  Encouraged to feed infant as often and for as long as the infant wishes to do so.  Mom has a double electric breast pump for home use.   Went over breastfeeding resources and the breastfeeding guide.  Offered support and encouraged to call for assistance or concerns.

## 2025-02-18 NOTE — PROGRESS NOTES
Patient actively pushing. RN remains in continuous attendance at the bedside. Assessment & evaluation of fetal heart rate, ongoing via continuous EFM.

## 2025-02-18 NOTE — L&D DELIVERY NOTE
Department of Obstetrics and Gynecology  Spontaneous Vaginal Delivery Note    Patient:  Ge Camarillo     Admit Date:  2025  9:03 PM  Medical Record Number:  [id number]   Procedure Date: 2025 10:55 AM     Pre-delivery Diagnosis: Primigravida IUP at 40 weeks and 2 days, suspected premature prolonged rupture membranes (5 days), large for dates (EFW at 38w4d 8#7, >90%, AC>97.7%), meconium fluid, obesity (BMI 31.16), hypothyroidism (levothyroxine 50 mcg daily), vitamin D insufficiency  Patient Active Problem List   Diagnosis    Primigravida in third trimester    Fundal height high for dates: 38cm at 35w: EFW 6#2 (69.7%; GHADA 21.99)    Hypothyroidism dx during pregnancy in third trimester - levothyroxine 50mcg daily    Vitamin D insufficiency    BMI 31.0-31.9,adult (31.16 pregravid BMI)    Obesity affecting pregnancy in third trimester    40 2/7 weeks gestation of pregnancy    Prolonged premature rupture of membranes - suspected from 25 (5 days)    Large for dates affecting management of mother, third trimester, not applicable or unspecified fetus (EFW at 38w4d 8#7, >90%, AC>97.7% GHADA = 18.75 cm.    Post-term pregnancy, 40-42 weeks of gestation    Encounter forCytotec cervical ripening/induction of labor (BS=7; cl/80/-1/mod/mid)    Meconium in amniotic fluid     Post-delivery Diagnosis:  Living  infant Female, macrosomic infant, second-degree laceration  Patient Active Problem List   Diagnosis    Primigravida in third trimester    Fundal height high for dates: 38cm at 35w: EFW 6#2 (69.7%; GHADA 21.99)    Hypothyroidism dx during pregnancy in third trimester - levothyroxine 50mcg daily    Vitamin D insufficiency    BMI 31.0-31.9,adult (31.16 pregravid BMI)    Obesity affecting pregnancy in third trimester    40 2/7 weeks gestation of pregnancy    Prolonged premature rupture of membranes - suspected from 25 (5 days)    Large for dates affecting management of mother, third trimester, not

## 2025-02-18 NOTE — PROGRESS NOTES
Patient sitting up at bedside for CSE to be completed. FSE and IUPC monitoring in place and tracing. Blood pressures cycling

## 2025-02-18 NOTE — PROGRESS NOTES
Called to room for AROM at the request of Dr. Babcock.     Patient very uncomfortable, not coping well  FHT's 135 with mod variability, + accels no decels no variables  Contractions  not tracing well   Cervix 4-5/90/-2     Leaking clear fluid    IUPC and FSE placed without difficulty    Patient tolerated fair    Impression:  Category 1 tracing  Pain not well managed with epidural     Plan:  Continued labor management per Dr. Babcock  IV pain medication  Anesthesia to replace epidural

## 2025-02-19 PROCEDURE — 6370000000 HC RX 637 (ALT 250 FOR IP): Performed by: OBSTETRICS & GYNECOLOGY

## 2025-02-19 PROCEDURE — 1220000000 HC SEMI PRIVATE OB R&B

## 2025-02-19 RX ADMIN — DOCUSATE SODIUM 100 MG: 100 CAPSULE, LIQUID FILLED ORAL at 20:55

## 2025-02-19 RX ADMIN — IBUPROFEN 600 MG: 600 TABLET, FILM COATED ORAL at 20:55

## 2025-02-19 RX ADMIN — IBUPROFEN 600 MG: 600 TABLET, FILM COATED ORAL at 06:20

## 2025-02-19 RX ADMIN — PRENATAL WITH FERROUS FUM AND FOLIC ACID 1 TABLET: 3080; 920; 120; 400; 22; 1.84; 3; 20; 10; 1; 12; 200; 27; 25; 2 TABLET ORAL at 07:51

## 2025-02-19 RX ADMIN — ACETAMINOPHEN 1000 MG: 500 TABLET ORAL at 07:52

## 2025-02-19 RX ADMIN — IBUPROFEN 600 MG: 600 TABLET, FILM COATED ORAL at 12:00

## 2025-02-19 RX ADMIN — DOCUSATE SODIUM 100 MG: 100 CAPSULE, LIQUID FILLED ORAL at 07:51

## 2025-02-19 ASSESSMENT — PAIN DESCRIPTION - ORIENTATION
ORIENTATION: LOWER

## 2025-02-19 ASSESSMENT — PAIN DESCRIPTION - DESCRIPTORS
DESCRIPTORS: TENDER;SORE;ACHING
DESCRIPTORS: CRAMPING
DESCRIPTORS: ACHING;DISCOMFORT;CRAMPING
DESCRIPTORS: CRAMPING;SORE;TENDER

## 2025-02-19 ASSESSMENT — PAIN SCALES - GENERAL
PAINLEVEL_OUTOF10: 5
PAINLEVEL_OUTOF10: 6
PAINLEVEL_OUTOF10: 6
PAINLEVEL_OUTOF10: 4
PAINLEVEL_OUTOF10: 2

## 2025-02-19 ASSESSMENT — PAIN DESCRIPTION - LOCATION
LOCATION: ABDOMEN
LOCATION: PERINEUM
LOCATION: ABDOMEN
LOCATION: ABDOMEN;PERINEUM

## 2025-02-19 ASSESSMENT — PAIN - FUNCTIONAL ASSESSMENT
PAIN_FUNCTIONAL_ASSESSMENT: ACTIVITIES ARE NOT PREVENTED
PAIN_FUNCTIONAL_ASSESSMENT: ACTIVITIES ARE NOT PREVENTED

## 2025-02-19 NOTE — PROGRESS NOTES
[facility] [place] Hearing screening results were discussed with parent. Questions answered. Brochure given to parent. Advised to monitor developmental milestones and contact physician for any concerns.   Annetta Harris, AuD

## 2025-02-19 NOTE — ANESTHESIA POSTPROCEDURE EVALUATION
Department of Anesthesiology  Postprocedure Note    Patient: Ge Camarillo  MRN: [id number]  YOB: 1995  Date of evaluation: 2/19/2025    Procedure Summary       Date: 02/17/25 Room / Location:     Anesthesia Start: 1016 Anesthesia Stop: 02/18/25 0957    Procedure: Labor Analgesia Diagnosis:     Scheduled Providers:  Responsible Provider: James Travis MD    Anesthesia Type: epidural ASA Status: 2            Anesthesia Type: No value filed.    Carolyn Phase I:      Carolyn Phase II:      Anesthesia Post Evaluation    Patient location during evaluation: bedside  Patient participation: complete - patient participated  Level of consciousness: awake  Airway patency: patent  Nausea & Vomiting: no nausea and no vomiting  Cardiovascular status: blood pressure returned to baseline and hemodynamically stable  Respiratory status: acceptable  Hydration status: euvolemic  Pain management: adequate        No notable events documented.

## 2025-02-19 NOTE — PROGRESS NOTES
PPD #1    Patient:  Ge Camarillo     Admit Date:  2/16/2025  9:03 PM  Medical Record Number:  [id number]   Today's Date: 2/19/2025    S: No complaints; tolerating diet: affirms; ambulating well: affirms; voiding without difficulty:  affirms; bm: denies; flatus: affirms; pain meds appropriate: affirms; vaginal bleeding: thin lochia.    O:   Vitals:    02/18/25 1237 02/18/25 1500 02/18/25 2310 02/19/25 0834   BP: 135/81 115/78 104/74 128/70   Pulse: (!) 108 (!) 104 (!) 115 97   Resp: 18 18 16 16   Temp: 97.9 °F (36.6 °C) 98.6 °F (37 °C) 98.6 °F (37 °C) 97.9 °F (36.6 °C)   TempSrc: Oral Oral Oral Oral   SpO2: 96% 97% 98% 97%     Gen: A&O, cooperative, pleasant   Heart: RRR   Lungs: CTA b/l   Abd; soft, NT, non distended, +BS  Back: no CVA or paraspinal tenderness   Ext: No clubbing, cyanosis, edema:1+ , no cords palpable, no calf tenderness   Neuro: intact   Uterus: firm, well contracted, nt   Perineum: intact, healing well   Yael pad: thin lochia    Lab Results   Component Value Date    HGB 11.8 02/16/2025    HCT 36.0 02/16/2025      Recent Results (from the past 72 hour(s))   POC AmniSure    Collection Time: 02/16/25  9:20 PM   Result Value Ref Range    Amnisure, POC Positive Negative    Lot Number [id number]     Positive QC Pass/Fail Pass     Negative QC Pass/Fail Pass    CBC    Collection Time: 02/16/25  9:35 PM   Result Value Ref Range    WBC 10.3 4.5 - 11.5 k/uL    RBC 4.01 3.50 - 5.50 m/uL    Hemoglobin 11.8 11.5 - 15.5 g/dL    Hematocrit 36.0 34.0 - 48.0 %    MCV 89.8 80.0 - 99.9 fL    MCH 29.4 26.0 - 35.0 pg    MCHC 32.8 32.0 - 34.5 g/dL    RDW 13.0 11.5 - 15.0 %    Platelets 233 130 - 450 k/uL    MPV 11.0 7.0 - 12.0 fL   TYPE AND SCREEN    Collection Time: 02/16/25  9:35 PM   Result Value Ref Range    Blood Bank Sample Expiration 02/19/2025,2359     Arm Band Number [id number]     ABO/Rh O POSITIVE     Antibody Screen NEGATIVE    Urine Drug Screen    Collection Time: 02/16/25  9:35 PM   Result Value Ref Range

## 2025-02-19 NOTE — LACTATION NOTE
Followed up with patient. Baby has been latching on and off and favoring the left breast. She is experiencing some soreness today. I provided her with hydrogel pads, explained use. I encouraged using nipple balm, as well. I recommended calling when she attempts to latch again to see if we can get her latched on right breast.

## 2025-02-20 VITALS
SYSTOLIC BLOOD PRESSURE: 119 MMHG | TEMPERATURE: 97.7 F | HEART RATE: 87 BPM | RESPIRATION RATE: 16 BRPM | OXYGEN SATURATION: 97 % | DIASTOLIC BLOOD PRESSURE: 73 MMHG

## 2025-02-20 PROBLEM — Z34.90 ENCOUNTER FOR INDUCTION OF LABOR: Status: RESOLVED | Noted: 2025-02-16 | Resolved: 2025-02-20

## 2025-02-20 PROBLEM — O36.63X0 LARGE FOR DATES FETUS AFFECTING PREGNANCY IN THIRD TRIMESTER, ANTEPARTUM: Status: RESOLVED | Noted: 2025-02-20 | Resolved: 2025-02-20
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
## 2025-02-20 PROBLEM — O36.63X0 LARGE FOR DATES FETUS AFFECTING PREGNANCY IN THIRD TRIMESTER, ANTEPARTUM: Status: ACTIVE | Noted: 2025-02-20

## 2025-02-20 PROBLEM — O99.213 OBESITY AFFECTING PREGNANCY IN THIRD TRIMESTER: Status: RESOLVED | Noted: 2025-01-23 | Resolved: 2025-02-20

## 2025-02-20 PROBLEM — O46.8X2 SUBCHORIONIC HEMATOMA IN SECOND TRIMESTER: Status: RESOLVED | Noted: 2025-01-23 | Resolved: 2025-02-20

## 2025-02-20 PROBLEM — Z34.90 ENCOUNTER FOR INDUCTION OF LABOR: Status: ACTIVE | Noted: 2025-02-16

## 2025-02-20 PROBLEM — O48.0 POST-TERM PREGNANCY, 40-42 WEEKS OF GESTATION: Status: ACTIVE | Noted: 2025-02-16

## 2025-02-20 PROBLEM — O42.10 PROLONGED PREMATURE RUPTURE OF MEMBRANES: Status: RESOLVED | Noted: 2025-02-16 | Resolved: 2025-02-20

## 2025-02-20 PROBLEM — L29.9 PRURITUS OF PREGNANCY IN THIRD TRIMESTER: Status: RESOLVED | Noted: 2025-01-23 | Resolved: 2025-02-20

## 2025-02-20 PROBLEM — Z3A.40 40 WEEKS GESTATION OF PREGNANCY: Status: RESOLVED | Noted: 2025-02-16 | Resolved: 2025-02-20

## 2025-02-20 PROBLEM — Z34.90 FUNDAL HEIGHT HIGH FOR DATES: Status: RESOLVED | Noted: 2025-01-23 | Resolved: 2025-02-20

## 2025-02-20 PROBLEM — O41.8X20 SUBCHORIONIC HEMATOMA IN SECOND TRIMESTER: Status: RESOLVED | Noted: 2025-01-23 | Resolved: 2025-02-20

## 2025-02-20 PROBLEM — O99.713 PRURITUS OF PREGNANCY IN THIRD TRIMESTER: Status: RESOLVED | Noted: 2025-01-23 | Resolved: 2025-02-20

## 2025-02-20 PROBLEM — O42.10 PROLONGED PREMATURE RUPTURE OF MEMBRANES: Status: ACTIVE | Noted: 2025-02-16

## 2025-02-20 PROBLEM — O48.0 POST-TERM PREGNANCY, 40-42 WEEKS OF GESTATION: Status: RESOLVED | Noted: 2025-02-16 | Resolved: 2025-02-20

## 2025-02-20 PROBLEM — Z34.03 PRIMIGRAVIDA IN THIRD TRIMESTER: Status: RESOLVED | Noted: 2025-01-23 | Resolved: 2025-02-20

## 2025-02-20 PROCEDURE — 6370000000 HC RX 637 (ALT 250 FOR IP): Performed by: OBSTETRICS & GYNECOLOGY

## 2025-02-20 RX ORDER — MODIFIED LANOLIN
1 OINTMENT (GRAM) TOPICAL PRN
COMMUNITY
Start: 2025-02-20

## 2025-02-20 RX ORDER — ACETAMINOPHEN 500 MG
1000 TABLET ORAL EVERY 6 HOURS PRN
COMMUNITY
Start: 2025-02-20

## 2025-02-20 RX ORDER — IBUPROFEN 600 MG/1
600 TABLET, FILM COATED ORAL EVERY 6 HOURS PRN
Qty: 60 TABLET | Refills: 1 | Status: SHIPPED | OUTPATIENT
Start: 2025-02-20

## 2025-02-20 RX ORDER — PSEUDOEPHEDRINE HCL 30 MG
100 TABLET ORAL 2 TIMES DAILY PRN
COMMUNITY
Start: 2025-02-20

## 2025-02-20 RX ADMIN — ACETAMINOPHEN 1000 MG: 500 TABLET ORAL at 09:00

## 2025-02-20 RX ADMIN — IBUPROFEN 600 MG: 600 TABLET, FILM COATED ORAL at 03:14

## 2025-02-20 RX ADMIN — LEVOTHYROXINE SODIUM 50 MCG: 0.05 TABLET ORAL at 06:16

## 2025-02-20 RX ADMIN — DOCUSATE SODIUM 100 MG: 100 CAPSULE, LIQUID FILLED ORAL at 09:00

## 2025-02-20 RX ADMIN — Medication: at 09:00

## 2025-02-20 RX ADMIN — Medication: at 03:15

## 2025-02-20 ASSESSMENT — PAIN - FUNCTIONAL ASSESSMENT: PAIN_FUNCTIONAL_ASSESSMENT: ACTIVITIES ARE NOT PREVENTED

## 2025-02-20 ASSESSMENT — PAIN DESCRIPTION - LOCATION
LOCATION: PERINEUM
LOCATION: PERINEUM

## 2025-02-20 ASSESSMENT — PAIN DESCRIPTION - DESCRIPTORS
DESCRIPTORS: ACHING;TENDER;SORE
DESCRIPTORS: SORE

## 2025-02-20 ASSESSMENT — PAIN SCALES - GENERAL
PAINLEVEL_OUTOF10: 2
PAINLEVEL_OUTOF10: 5
PAINLEVEL_OUTOF10: 5

## 2025-02-20 ASSESSMENT — PAIN DESCRIPTION - ORIENTATION: ORIENTATION: LOWER

## 2025-02-20 NOTE — DISCHARGE INSTRUCTIONS
Follow-up with your OB doctor in 4 to  6 weeks for vaginal delivery unless otherwise instructed.   Call office for an appointment.    For breastfeeding support, you can contact our lactation specialists at [phone] or [phone]    DIET  Eat a well balanced diet focusing on foods high in fiber and protein  Drink plenty of fluids especially water.  To avoid constipation you may take a mild stool softener as recommended by your doctor or midwife.    ACTIVITY  Gradually increase your activity.  Resume exercise regimen only after advised by your doctor or midwife.  Avoid lifting anything heavier than your baby or a gallon of milk for SIX weeks.   Avoid driving until your doctor or midwife has given their approval.  Rise slowly from a lying to sitting and then a standing position.  Climb stairs one at a time.  Use caution when carrying your baby up and down the stairs.  No sexual activity for 6 weeks or until advised by your doctor - Nothing in vagina: intercourse, tampons, or douching.   Be prepared to discuss family planning at your follow-up OB visit.   You may feel tired or have a lack of energy.  You may continue your prenatal vitamin to replenish nutrients post delivery.  Nap when baby naps to catch up on sleep.  May return to work or school in 6 weeks or as directed by OB.     EMOTIONS  You may feed bl[place], sad, teary, & overwhelmed.  Contact your OB provider if you feel you may be showing signs of postpartum depression, or have thoughts of harming yourself or your infant.  If infant will not stop crying, contact another adult for help or place infant in their crib on their back and take a break.  NEVER shake your infant.      BLEEDING  Vaginal bleeding will decrease in amount over the next few weeks.  You will notice that as your activity increases, your flow may increase.  This is your body's way of telling you, you need to take things easier and rest more often.  Call your OB/ER if you are saturating more

## 2025-02-20 NOTE — DISCHARGE INSTR - ACTIVITY
After Your Delivery Discharge Instructions    What to do after you leave the hospital:    Recommended diet: regular diet  Recommended activity: No driving for 1 weeks, no sex or tampon use for 6 weeks. No douching.  No heavy lifting (greater than baby and carrier) for 6 weeks.  Initially, avoid frequent ambulation with stairs - start slowly then increase as tolerated.  You may return to work in 6 weeks.  Showers are fine - avoid bath tubs, hot tubs, swimming.  Follow-up in 6 weeks for final postpartum visit.  Continue the prenatal vitamins with iron over the next 6 weeks minimum.  If you are breast-feeding, continue them until you are no longer nursing.  Continue current dose of Synthroid.  Thyroid function testing will need repeated at your postpartum visit.    Call the Physician with any of these signs and symptoms:    Warning signs regarding stitches:  \"Popping\" of stitches  Foul smelling discharge or pus  More redness or streaks around stitches than before    Perineum / episiotomy care:  Continue care as in the hospital (sitz baths, squirt bottle, etc.)  No tub baths until OK'd by your Physician , showers are fine     After your delivery - signs and symptoms to watch for:  Fever - Oral temperature greater than 100.4 degrees Fahrenheit  Foul-smelling vaginal discharge  Headache unrelieved by \"pain meds\"  Difficulty urinating  Breasts reddened, hard, hot to the touch  Nipple discharge which is foul-smelling or contains pus  Increased pain at the site of the laceration repair  Difficulty breathing with or without chest pain  New calf pain especially if only on one side  Sudden, continuing increased vaginal bleeding (heavier than a period) with or without clots  Unrelieved feelings of:  Inability to cope  Sadness  Anxiety  Lack of interest in baby  Insomnia  Crying     What to do at home:  Resume activity gradually   Don't lift anything heavier than baby and carrier until OK'd by your Physician   No sex until OK'd

## 2025-02-20 NOTE — DISCHARGE SUMMARY
needed for Pain  Qty: 60 tablet, Refills: 1      lansinoh lanolin CREA ointment Apply 1 Application topically as needed for Dry Skin (nipple discomfort)      witch hazel-glycerin (TUCKS) pad Place rectally as needed.      acetaminophen (TYLENOL) 500 MG tablet Take 2 tablets by mouth every 6 hours as needed for Pain           CONTINUE these medications which have NOT CHANGED    Details   vitamin D 50 MCG (2000 UT) CAPS capsule Take 1 capsule by mouth daily      levothyroxine (SYNTHROID) 50 MCG tablet Take 1 tablet by mouth daily  Qty: 90 tablet, Refills: 1      PREN-FE-METH-FA-OMEG W/O A PO Take by mouth           STOP taking these medications       ursodiol (ACTIGALL) 300 MG capsule Comments:   Reason for Stopping:             Follow-Up Visit Plan: In 6 weeks for final postpartum visit and repeat thyroid function testing.  Disposition: Home.    Time Spent on Discharge:  30 minutes were spent in patient examination, evaluation, counseling as well as medication reconciliation, prescriptions for required medications, discharge plan and follow up.      Edson Babcock MD MD,FACOG    2/20/2025 10:09 AM

## 2025-02-20 NOTE — PROGRESS NOTES
PPD #2     Patient:  Ge Camarillo     Admit Date:  2/16/2025  9:03 PM  Medical Record Number:  [id number]   Today's Date: 2/20/2025    S: No complaints, doing well, ready to go home today; tolerating diet: yes -general; ambulating well: yes -up in room; voiding without difficulty:  yes -has no urinary complaints; bm: yes - normal x 2; flatus: yes -normal; pain meds appropriate: yes -ibuprofen 600 mg and Tylenol; vaginal bleeding: Less than a period.    O:   Vitals:    02/19/25 0834 02/19/25 1456 02/19/25 2258 02/20/25 0820   BP: 128/70 (!) 102/55 124/78 119/73   Pulse: 97 (!) 106 (!) 101 87   Resp: 16 16 18 16   Temp: 97.9 °F (36.6 °C) 98.1 °F (36.7 °C) 98.2 °F (36.8 °C) 97.7 °F (36.5 °C)   TempSrc: Oral Oral Oral Oral   SpO2: 97% 96% 97% 97%     Gen: A&O, cooperative, pleasant   Heart: RRR   Lungs: CTA b/l   Abd; soft, NT, non distended, +BS  Back: no CVA or paraspinal tenderness   Ext: No clubbing, cyanosis, edema:trace , no cords palpable, no calf tenderness   Neuro: intact   Uterus: firm, well contracted, nt   Perineum: intact, healing well   Yael pad: staining only, thin lochia    Lab Results   Component Value Date    HGB 11.8 02/16/2025    HCT 36.0 02/16/2025      Recent Results (from the past 96 hour(s))   POC AmniSure    Collection Time: 02/16/25  9:20 PM   Result Value Ref Range    Amnisure, POC Positive Negative    Lot Number [id number]     Positive QC Pass/Fail Pass     Negative QC Pass/Fail Pass    CBC    Collection Time: 02/16/25  9:35 PM   Result Value Ref Range    WBC 10.3 4.5 - 11.5 k/uL    RBC 4.01 3.50 - 5.50 m/uL    Hemoglobin 11.8 11.5 - 15.5 g/dL    Hematocrit 36.0 34.0 - 48.0 %    MCV 89.8 80.0 - 99.9 fL    MCH 29.4 26.0 - 35.0 pg    MCHC 32.8 32.0 - 34.5 g/dL    RDW 13.0 11.5 - 15.0 %    Platelets 233 130 - 450 k/uL    MPV 11.0 7.0 - 12.0 fL   TYPE AND SCREEN    Collection Time: 02/16/25  9:35 PM   Result Value Ref Range    Blood Bank Sample Expiration 02/19/2025,1476     Arm Band Number

## 2025-02-20 NOTE — DISCHARGE INSTR - DIET

## 2025-02-20 NOTE — PLAN OF CARE
Problem: Pain  Goal: Verbalizes/displays adequate comfort level or baseline comfort level  2025 by Hui Bailey, RN  Outcome: Progressing     Problem: Infection - Adult  Goal: Absence of infection during hospitalization  2025 by Hui Bailey, RN  Outcome: Progressing     Problem: Safety - Adult  Goal: Free from fall injury  2025 by Hui Bailey, RN  Outcome: Progressing     Problem: Safety -   Goal: Free from fall injury  2025 by Hui Bailey, RN  Outcome: Progressing